# Patient Record
Sex: MALE | Race: BLACK OR AFRICAN AMERICAN | Employment: UNEMPLOYED | URBAN - METROPOLITAN AREA
[De-identification: names, ages, dates, MRNs, and addresses within clinical notes are randomized per-mention and may not be internally consistent; named-entity substitution may affect disease eponyms.]

---

## 2023-01-19 ENCOUNTER — HOSPITAL ENCOUNTER (EMERGENCY)
Age: 38
Discharge: HOME | End: 2023-01-19

## 2023-01-19 ENCOUNTER — APPOINTMENT (OUTPATIENT)
Dept: GENERAL RADIOLOGY | Age: 38
End: 2023-01-19
Attending: PHYSICIAN ASSISTANT

## 2023-01-19 VITALS
RESPIRATION RATE: 17 BRPM | WEIGHT: 167.55 LBS | HEART RATE: 85 BPM | BODY MASS INDEX: 23.99 KG/M2 | HEIGHT: 70 IN | SYSTOLIC BLOOD PRESSURE: 140 MMHG | DIASTOLIC BLOOD PRESSURE: 86 MMHG | TEMPERATURE: 98.5 F | OXYGEN SATURATION: 98 %

## 2023-01-19 DIAGNOSIS — G89.29 CHRONIC BILATERAL LOW BACK PAIN WITHOUT SCIATICA: Primary | ICD-10-CM

## 2023-01-19 DIAGNOSIS — M54.50 CHRONIC BILATERAL LOW BACK PAIN WITHOUT SCIATICA: Primary | ICD-10-CM

## 2023-01-19 PROCEDURE — 96372 THER/PROPH/DIAG INJ SC/IM: CPT

## 2023-01-19 PROCEDURE — 74011250636 HC RX REV CODE- 250/636: Performed by: PHYSICIAN ASSISTANT

## 2023-01-19 PROCEDURE — 72100 X-RAY EXAM L-S SPINE 2/3 VWS: CPT

## 2023-01-19 PROCEDURE — 99284 EMERGENCY DEPT VISIT MOD MDM: CPT

## 2023-01-19 RX ORDER — KETOROLAC TROMETHAMINE 30 MG/ML
30 INJECTION, SOLUTION INTRAMUSCULAR; INTRAVENOUS ONCE
Status: COMPLETED | OUTPATIENT
Start: 2023-01-19 | End: 2023-01-19

## 2023-01-19 RX ORDER — LIDOCAINE 50 MG/G
PATCH TOPICAL
Qty: 3 EACH | Refills: 0 | Status: SHIPPED | OUTPATIENT
Start: 2023-01-19

## 2023-01-19 RX ADMIN — KETOROLAC TROMETHAMINE 30 MG: 30 INJECTION, SOLUTION INTRAMUSCULAR; INTRAVENOUS at 20:18

## 2023-01-20 NOTE — ED TRIAGE NOTES
Pt complains of low back pain. Patient informs this nurse in triage that he is homeless and has no where to stay. Patient states that his back has hurt for years, but since he has not had anywhere to sleep his back pain has worsened.

## 2023-01-20 NOTE — ED NOTES
Patient asked this writer if he could be moved to a room so he can sleep. Explained to patient that we do not have any rooms available at this time.

## 2023-01-20 NOTE — ED NOTES
When reviewing discharge instructions with patient. Patient refused to leave the department. Nurse offered patient a cab voucher and patient refused to accept this offer stating that he does not want to leave, but only wants to stay here. Security contacted to escort patient out of department.

## 2023-01-20 NOTE — ED PROVIDER NOTES
[facility] EMERGENCY DEPT  EMERGENCY DEPARTMENT HISTORY AND PHYSICAL EXAM      Date: 1/19/2023  Patient Name: Sean Ulrich  MRN: [id number]  [place]: 1985  Date of evaluation: 1/19/2023  Provider: Conni Councilman, PA-C   Note Started: 7:43 PM 1/19/23    HISTORY OF PRESENT ILLNESS     Chief Complaint   Patient presents with    LOW BACK PAIN       History Provided By: Patient    HPI: Sean Ulrich, 44 y.o. male with no significant past medical history presents this ED with cc of chronic low back pain. Patient reports onset of current exacerbation 3 to 4 days ago. Attributes his pain to increased walking. Denies any recent trauma or injury. Denies treating symptoms anything. Notes no alleviating exacerbating factors. Patient specifically denies any saddle anesthesia, urinary/fecal incontinence, numbness, weakness, difficulty ambulating. Denies any fevers, chills. Of note, patient is also reportedly homeless and attributes his symptoms to not having a bed to sleep in. PAST MEDICAL HISTORY   Past Medical History:  History reviewed. No pertinent past medical history. Past Surgical History:  History reviewed. No pertinent surgical history. Family History:  History reviewed. No pertinent family history. Social History:  Social History     Tobacco Use    Smoking status: Never    Smokeless tobacco: Never   Substance Use Topics    Drug use: Not Currently       Allergies:  No Known Allergies    PCP: None    Current Meds:   Discharge Medication List as of 1/19/2023  9:51 PM          REVIEW OF SYSTEMS   Review of Systems   Constitutional: Negative. Negative for chills, diaphoresis and fever. HENT: Negative. Negative for congestion, rhinorrhea and sore throat. Eyes: Negative. Negative for pain. Respiratory: Negative. Negative for cough, chest tightness, shortness of breath and wheezing. Cardiovascular: Negative. Negative for chest pain and palpitations. Gastrointestinal: Negative. Negative for abdominal pain, diarrhea, nausea and vomiting. Genitourinary: Negative. Negative for difficulty urinating, dysuria, flank pain, frequency and hematuria. Musculoskeletal:  Positive for back pain. Negative for gait problem. Skin: Negative. Negative for rash. Neurological: Negative. Negative for dizziness, syncope, weakness, light-headedness, numbness and headaches. Psychiatric/Behavioral: Negative. All other systems reviewed and are negative. Positives and Pertinent negatives as per HPI. PHYSICAL EXAM     ED Triage Vitals [01/19/23 1918]   ED Encounter Vitals Group      BP (!) 140/86      Pulse (Heart Rate) 85      Resp Rate 17      Temp 98.5 °F (36.9 °C)      Temp src       O2 Sat (%) 98 %      Weight 167 lb 8.8 oz      Height 5' 10\"      Physical Exam  Vitals and nursing note reviewed. Constitutional:       General: He is not in acute distress. Appearance: Normal appearance. He is not ill-appearing or toxic-appearing. HENT:      Head: Normocephalic and atraumatic. Mouth/Throat:      Mouth: Mucous membranes are moist.   Eyes:      Extraocular Movements: Extraocular movements intact. Conjunctiva/sclera: Conjunctivae normal.      Pupils: Pupils are equal, round, and reactive to light. Cardiovascular:      Rate and Rhythm: Normal rate and regular rhythm. Pulses: Normal pulses. Heart sounds: Normal heart sounds. No murmur heard. No friction rub. No gallop. Pulmonary:      Effort: Pulmonary effort is normal. No respiratory distress. Breath sounds: Normal breath sounds. No wheezing, rhonchi or rales. Abdominal:      General: Bowel sounds are normal. There is no distension. Palpations: Abdomen is soft. Tenderness: There is no abdominal tenderness. There is no guarding or rebound. Musculoskeletal:      Cervical back: Neck supple. Lumbar back: Tenderness (paraspinal musculature) present. No deformity or bony tenderness.  Negative right straight leg raise test and negative left straight leg raise test.   Skin:     General: Skin is warm and dry. Capillary Refill: Capillary refill takes less than 2 seconds. Findings: No rash. Neurological:      General: No focal deficit present. Mental Status: He is alert and oriented to person, place, and time. Sensory: Sensation is intact. Motor: Motor function is intact. Gait: Gait is intact. Deep Tendon Reflexes:      Reflex Scores:       Patellar reflexes are 2+ on the right side and 2+ on the left side. Psychiatric:         Mood and Affect: Mood normal.         Behavior: Behavior normal.       SCREENINGS               No data recorded        LAB, EKG AND DIAGNOSTIC RESULTS   Labs:  No results found for this or any previous visit (from the past 12 hour(s)). Radiologic Studies:  Non-plain film images such as CT, Ultrasound and MRI are read by the radiologist. Plain radiographic images are visualized and preliminarily interpreted by the ED Provider with the below findings:      Interpretation per the Radiologist below, if available at the time of this note:  XR SPINE LUMB 2 OR 3 V    Result Date: 1/19/2023  INDICATION:  low back pain Exam: AP, lateral and cone-down views of the lumbar spine. FINDINGS: There is no acute fracture or subluxation. Intervertebral disc spaces are well maintained. Bones are well-mineralized. Soft tissues are normal.     No acute fracture or subluxation. PROCEDURES   Unless otherwise noted below, none.   Performed by: Belkys Chand PA-C   Procedures        ED COURSE and DIFFERENTIAL DIAGNOSIS/MDM   Vitals:    Vitals:    01/19/23 1918   BP: (!) 140/86   Pulse: 85   Resp: 17   Temp: 98.5 °F (36.9 °C)   SpO2: 98%   Weight: 76 kg (167 lb 8.8 oz)   Height: 5' 10\" (1.778 m)        Patient was given the following medications:  Medications   ketorolac (TORADOL) injection 30 mg (30 mg IntraMUSCular Given 1/19/23 2018)       CONSULTS: (Who and What was discussed)  None     Records Reviewed (source and summary of external notes): Nursing Notes and Old Medical Records    CC/HPI Summary, DDx, ED Course, and Reassessment:   The patient presents with acute low back pain. Stable vitals and benign exam. No concerning red flags per history or exam. DDx: strain, sprain, sciatica, MSK pain. Clinical presentation not consistent with  pathology, aortic dissection or AAA. There is no urine/bowel incontinence or perianal numbness to suggest cauda equina. No fever/chills, IVDA to suggest epidural abscess or discitis. No focal weakness or sensory changes to suggest transverse myelitis. Therefore MRI not indicated. I have recommended rest, avoiding heavy lifting until better, use of intermittent heat (avoid sleeping on a heating pad), and use of OTC NSAID's (Advil, Aleve etc) or Tylenol prn for pain. Call PCP if back pain persists or he develops leg symptoms or other concerning red flags. Will provide pain control and refer to PT. FINAL IMPRESSION     1. Chronic bilateral low back pain without sciatica          DISPOSITION/PLAN   Discharged    Discharge Note: The patient is stable for discharge home. The signs, symptoms, diagnosis, and discharge instructions have been discussed, understanding conveyed, and agreed upon. The patient is to follow up as recommended or return to ER should their symptoms worsen.       PATIENT REFERRED TO:  Follow-up Information       Follow up With Specialties Details Why Contact Info    Willie Hunter MD Orthopedic Surgery Schedule an appointment as soon as possible for a visit  As needed [address]  [phone]      [address] [facility] EMERGENCY DEPT Emergency Medicine  If symptoms worsen [address] [address]  [phone]              DISCHARGE MEDICATIONS:  Discharge Medication List as of 1/19/2023  9:51 PM              DISCONTINUED MEDICATIONS:  Discharge Medication List as of 1/19/2023  9:51 PM            I am the Primary Clinician of Record: Carlos Gregorio PA-C (electronically signed)    (Please note that parts of this dictation were completed with voice recognition software. Quite often unanticipated grammatical, syntax, homophones, and other interpretive errors are inadvertently transcribed by the computer software. Please disregards these errors.  Please excuse any errors that have escaped final proofreading.)